# Patient Record
Sex: FEMALE
[De-identification: names, ages, dates, MRNs, and addresses within clinical notes are randomized per-mention and may not be internally consistent; named-entity substitution may affect disease eponyms.]

---

## 2023-08-02 ENCOUNTER — NURSE TRIAGE (OUTPATIENT)
Dept: OTHER | Facility: CLINIC | Age: 48
End: 2023-08-02

## 2023-08-02 NOTE — TELEPHONE ENCOUNTER
[place]    Received call from [facility] at [facility]; Patient with [employer] requesting to establish care with Roby WARD Eric, [id number], was called and joined the call. Subjective: Caller states \"dizziness, problems with heart, nosebleed\"     Current Symptoms: thinks her bp is falling, vision gets blurry, trouble breathing when this happens, heart races. Checked bp 3 days ago and it was ok. Takes medication for HTN. No symptoms at the moment. Occasional arm and leg pain, tingling sensation on bottom of both feet in the mornings, none now. Onset: 2 months ago; worsening    Associated Symptoms: NA    Pain Severity: 0/10; N/A; none    Temperature: denies fever     What has been tried: tylenol    LMP:  2-3 months ago  Pregnant: No    Recommended disposition: See PCP within 24 Hours, writer advised patient to call back tomorrow morning for a new patient appointment since the office is closed. Writer advised going to [facility] if she cannot obtain a new patient appointment within 24 hours. Care advice provided, patient verbalizes understanding; denies any other questions or concerns; instructed to call back for any new or worsening symptoms. See above    Attention Provider: Thank you for allowing me to participate in the care of your patient. The patient was connected to triage in response to information provided to the [facility]. Please do not respond through this encounter as the response is not directed to a shared pool.     Reason for Disposition   [1] NO dizziness now AND [2] one or more stroke risk factors (i.e., hypertension, diabetes, prior stroke/TIA/heart attack)    Protocols used: Dizziness - Vertigo-ADULT-